# Patient Record
(demographics unavailable — no encounter records)

---

## 2024-10-16 NOTE — PHYSICAL EXAM
[General Appearance - Alert] : alert [General Appearance - In No Acute Distress] : in no acute distress [Motor Handedness Right-Handed] : the patient is right hand dominant [Hand Weakness Right] : the hand  was weak [Motor Strength Ankle Right Weakness] : ankle weakness was present [Tactile Decrease Hemisensory Entire Right Side] : diminished right side [Abnormal Walk] : normal gait [3+] : Patella right 3+ [2+] : Ankle jerk right 2+ [1+] : Ankle jerk left 1+ [No Spinal Tenderness] : no spinal tenderness [Hand Weakness Left] : normal hand  [Motor Strength Hips Right Weakness] : normal hip strength [Motor Strength Knee Right Weakness] : normal knee strength [Motor Strength Hips Left Weakness] : normal hip strength [Motor Strength Knee Left Weakness] : normal knee strength [Motor Strength Ankle Left Weakness] : normal ankle strength [Tactile Decrease Hemisensory Entire Left Side] : normal left side [Dysdiadochokinesia Bilaterally] : not present [Coordination - Dysmetria Impaired Finger-to-Nose Bilateral] : not present [FreeTextEntry6] : Mildly increased tone in the right [FreeTextEntry1] : Full range of the right elbow and wrist, nontender to palpation, 2+ pulses of the radial and ulnar nerve at the wrist, distal forearm.

## 2024-10-16 NOTE — DISCUSSION/SUMMARY
[FreeTextEntry1] : 64-year-old woman with a history of prior stroke left subcortical, thalamic infarct. Complaining of numbness tingling of the right hand and arm, electrodiagnostic study consistent with mild right carpal tunnel, mild ulnar neuropathy at the elbow. Advised and discussed findings. Advised to wear nighttime wrist brace, can also use a elbow brace, avoid putting pressure over the elbow when she is working. Can consider local infiltration of steroids, although at this time she does not want any of such treatment. Discussed other options, carpal tunnel surgery, transposition of the ulnar nerve at the elbow.

## 2024-10-16 NOTE — HISTORY OF PRESENT ILLNESS
[FreeTextEntry1] : 64-year-old woman with a history of hypertension, recent stroke here for follow-up visit.  Complaining of numbness, tingling feelings occasional pains of the right arm and hand.  No preceding trauma or injury. Occasionally will wake her up from sleep all the fingers of the hand feeling tingly numb.  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 Occasional pains of the extensor surface of the elbow at the wrist joint, but no swelling, no redness. Post occasional shoulder pain, sometimes limiting range of motion of the shoulder, occasionally the upper neck, upper back feels stiff achy. Denies any trouble walking, denies similar symptoms on the opposite side.

## 2024-10-16 NOTE — PROCEDURE
[FreeTextEntry1] : Nerve conduction study of the right upper extremity, demonstrates a mild sensory right median nerve entrapment neuropathy at the wrist consistent with a clinical diagnosis of carpal tunnel syndrome. There is mild slowing across the elbow for the right ulnar nerve consistent with a mild cubital tunnel syndrome. Patient refused needle EMG examination.

## 2024-10-16 NOTE — DATA REVIEWED
[de-identified] :   ACC: [id number]     EXAM:  MR BRAIN   ORDERED BY: ALFRED GUTIERREZ  PROCEDURE DATE:  07/23/2024    INTERPRETATION:  EXAM: MRI OF THE BRAIN WITHOUT CONTRAST  HISTORY: Right-sided weakness, history of prior CVA  TECHNIQUE: Multi-planar multi-sequential MR imaging of the brain was performed without intravenous contrast.  COMPARISON: MRI of the brain July 9, 2024, CT/CTA of the head July 22, 2024.  FINDINGS:  Redemonstrated focus of diffusion restriction in the left thalamus, compatible with patient's known acute thalamic infarct, decreased in conspicuity when compared to prior imaging. No new focus of diffusion restriction to suggest acute interval infarct. No hydrocephalus. Foci of increased T2/FLAIR signal throughout the deep and periventricular white matter as well as within the rubén, likely related to chronic small vessel disease. The visualized extra axial spaces and basal cisterns are within normal limits. No midline shift or mass effect present.  The craniocervical junction is within normal limits. The pituitary is unremarkable. The major intracranial vessels demonstrate the expected signal void related to vascular flow. Mucosal thickening throughout the paranasal sinuses. Few fluid-filled right mastoid air cells. The visualized orbits are within normal limits.   IMPRESSION:  1.  Redemonstrated known acute left thalamic infarct. No new focus of diffusion restriction to suggest interval acute ischemia. 2.  Chronic small vessel disease.  --- End of Report ---      BENY PEDRO MD; Attending Radiologist This document has been electronically signed. Jul 23 2024  2:21PM

## 2024-10-16 NOTE — REVIEW OF SYSTEMS
[As Noted in HPI] : as noted in HPI [Numbness] : numbness [Tingling] : tingling [Sleep Disturbances] : sleep disturbances [Joint Pain] : joint pain [Joint Stiffness] : joint stiffness [Negative] : Heme/Lymph [Joint Swelling] : no joint swelling